# Patient Record
Sex: FEMALE | Race: WHITE | NOT HISPANIC OR LATINO | Employment: OTHER | URBAN - METROPOLITAN AREA
[De-identification: names, ages, dates, MRNs, and addresses within clinical notes are randomized per-mention and may not be internally consistent; named-entity substitution may affect disease eponyms.]

---

## 2017-02-22 ENCOUNTER — LAB VISIT (OUTPATIENT)
Dept: LAB | Facility: HOSPITAL | Age: 82
End: 2017-02-22
Attending: INTERNAL MEDICINE
Payer: MEDICARE

## 2017-02-22 DIAGNOSIS — D50.9 IRON DEFICIENCY ANEMIA, UNSPECIFIED IRON DEFICIENCY ANEMIA TYPE: ICD-10-CM

## 2017-02-22 DIAGNOSIS — E53.8 B12 DEFICIENCY: ICD-10-CM

## 2017-02-22 LAB
ALBUMIN SERPL BCP-MCNC: 4.3 G/DL
ALP SERPL-CCNC: 73 U/L
ALT SERPL W/O P-5'-P-CCNC: 33 U/L
ANION GAP SERPL CALC-SCNC: 10 MMOL/L
AST SERPL-CCNC: 33 U/L
BASOPHILS # BLD AUTO: 0.01 K/UL
BASOPHILS NFR BLD: 0.1 %
BILIRUB SERPL-MCNC: 0.8 MG/DL
BUN SERPL-MCNC: 21 MG/DL
CALCIUM SERPL-MCNC: 9.4 MG/DL
CHLORIDE SERPL-SCNC: 102 MMOL/L
CO2 SERPL-SCNC: 29 MMOL/L
CREAT SERPL-MCNC: 0.76 MG/DL
DIFFERENTIAL METHOD: ABNORMAL
EOSINOPHIL # BLD AUTO: 0.3 K/UL
EOSINOPHIL NFR BLD: 4.7 %
ERYTHROCYTE [DISTWIDTH] IN BLOOD BY AUTOMATED COUNT: 15.9 %
EST. GFR  (AFRICAN AMERICAN): >60 ML/MIN/1.73 M^2
EST. GFR  (NON AFRICAN AMERICAN): >60 ML/MIN/1.73 M^2
FERRITIN SERPL-MCNC: 15 NG/ML
GLUCOSE SERPL-MCNC: 88 MG/DL
HCT VFR BLD AUTO: 37.4 %
HGB BLD-MCNC: 12.2 G/DL
IRON SATN MFR SERPL: 10 %
IRON SERPL-MCNC: 46 UG/DL
IRON SERPL-MCNC: 46 UG/DL
LYMPHOCYTES # BLD AUTO: 2.9 K/UL
LYMPHOCYTES NFR BLD: 42 %
MCH RBC QN AUTO: 25.4 PG
MCHC RBC AUTO-ENTMCNC: 32.6 %
MCV RBC AUTO: 78 FL
MONOCYTES # BLD AUTO: 0.7 K/UL
MONOCYTES NFR BLD: 10.4 %
NEUTROPHILS # BLD AUTO: 2.9 K/UL
NEUTROPHILS NFR BLD: 42.8 %
NRBC BLD-RTO: 0 /100 WBC
PLATELET # BLD AUTO: 329 K/UL
PMV BLD AUTO: 8.7 FL
POTASSIUM SERPL-SCNC: 4.1 MMOL/L
PROT SERPL-MCNC: 7.8 G/DL
RBC # BLD AUTO: 4.8 M/UL
SODIUM SERPL-SCNC: 141 MMOL/L
TOTAL IRON BINDING CAPACITY: 442 UG/DL
VIT B12 SERPL-MCNC: 497 PG/ML
WBC # BLD AUTO: 6.81 K/UL

## 2017-02-22 PROCEDURE — 36415 COLL VENOUS BLD VENIPUNCTURE: CPT | Mod: PN

## 2017-02-22 PROCEDURE — 85025 COMPLETE CBC W/AUTO DIFF WBC: CPT

## 2017-02-22 PROCEDURE — 80053 COMPREHEN METABOLIC PANEL: CPT | Mod: PN

## 2017-02-22 PROCEDURE — 82607 VITAMIN B-12: CPT

## 2017-02-22 PROCEDURE — 82728 ASSAY OF FERRITIN: CPT

## 2017-02-22 PROCEDURE — 82607 VITAMIN B-12: CPT | Mod: PN

## 2017-02-22 PROCEDURE — 80053 COMPREHEN METABOLIC PANEL: CPT

## 2017-02-22 PROCEDURE — 83540 ASSAY OF IRON: CPT | Mod: PN

## 2017-02-22 PROCEDURE — 82728 ASSAY OF FERRITIN: CPT | Mod: PN

## 2017-02-22 PROCEDURE — 85025 COMPLETE CBC W/AUTO DIFF WBC: CPT | Mod: PN

## 2017-02-22 PROCEDURE — 83540 ASSAY OF IRON: CPT

## 2017-03-02 PROBLEM — D50.9 IRON DEFICIENCY ANEMIA: Status: ACTIVE | Noted: 2017-03-02

## 2017-04-04 PROBLEM — D51.9 VITAMIN B12 DEFICIENCY ANEMIA: Status: ACTIVE | Noted: 2017-04-04

## 2019-08-30 ENCOUNTER — TELEPHONE (OUTPATIENT)
Dept: OPTOMETRY | Facility: CLINIC | Age: 84
End: 2019-08-30

## 2019-09-04 ENCOUNTER — TELEPHONE (OUTPATIENT)
Dept: OPHTHALMOLOGY | Facility: CLINIC | Age: 84
End: 2019-09-04

## 2019-09-04 NOTE — TELEPHONE ENCOUNTER
----- Message from Tiki Hurtado sent at 9/4/2019  1:06 PM CDT -----  Contact: [phone]  Patient is returning nurse's phone call.  Please call patient back at [phone].

## 2020-03-04 ENCOUNTER — TELEPHONE (OUTPATIENT)
Dept: PAIN MEDICINE | Facility: CLINIC | Age: 85
End: 2020-03-04

## 2020-03-04 NOTE — TELEPHONE ENCOUNTER
----- Message from Gregoria Porter sent at 3/4/2020 10:09 AM CST -----  Contact: Ashly with Dr Harrison office  Type:  Sooner Apoointment Request    Caller is requesting a sooner appointment.  Caller declined first available appointment listed below.  Caller will not accept being placed on the waitlist and is requesting a message be sent to doctor.    Name of Caller:  Ashly with Dr Graves office  When is the first available appointment? 03/27  Symptoms:   Best Call Back Number: [phone] (home)   Additional Information:  Ashly with Dr Graves office stated that this the doctor's aunt, that March 27th because the pt has a lot of health issues, she also stated that the fall. pls call to advise

## 2020-03-05 ENCOUNTER — OFFICE VISIT (OUTPATIENT)
Dept: PAIN MEDICINE | Facility: CLINIC | Age: 85
End: 2020-03-05
Payer: MEDICARE

## 2020-03-05 ENCOUNTER — HOSPITAL ENCOUNTER (OUTPATIENT)
Dept: RADIOLOGY | Facility: HOSPITAL | Age: 85
Discharge: HOME | End: 2020-03-05
Attending: ANESTHESIOLOGY
Payer: MEDICARE

## 2020-03-05 VITALS
WEIGHT: 185.5 LBS | BODY MASS INDEX: 29.81 KG/M2 | HEART RATE: 86 BPM | TEMPERATURE: 97 F | DIASTOLIC BLOOD PRESSURE: 79 MMHG | SYSTOLIC BLOOD PRESSURE: 148 MMHG | HEIGHT: 66 IN | RESPIRATION RATE: 18 BRPM

## 2020-03-05 DIAGNOSIS — G89.29 CHRONIC BILATERAL LOW BACK PAIN WITHOUT SCIATICA: ICD-10-CM

## 2020-03-05 DIAGNOSIS — M47.816 LUMBAR SPONDYLOSIS: ICD-10-CM

## 2020-03-05 DIAGNOSIS — M54.50 CHRONIC BILATERAL LOW BACK PAIN WITHOUT SCIATICA: ICD-10-CM

## 2020-03-05 DIAGNOSIS — M47.816 LUMBAR SPONDYLOSIS: Primary | ICD-10-CM

## 2020-03-05 PROCEDURE — 1125F PR PAIN SEVERITY QUANTIFIED, PAIN PRESENT: ICD-10-PCS | Mod: S$GLB,,, | Performed by: ANESTHESIOLOGY

## 2020-03-05 PROCEDURE — 3288F PR FALLS RISK ASSESSMENT DOCUMENTED: ICD-10-PCS | Mod: CPTII,S$GLB,, | Performed by: ANESTHESIOLOGY

## 2020-03-05 PROCEDURE — 3078F PR MOST RECENT DIASTOLIC BLOOD PRESSURE < 80 MM HG: ICD-10-PCS | Mod: CPTII,S$GLB,, | Performed by: ANESTHESIOLOGY

## 2020-03-05 PROCEDURE — 72120 XR LUMBAR SPINE AP AND LAT WITH FLEX/EXT: ICD-10-PCS | Mod: 26,,, | Performed by: RADIOLOGY

## 2020-03-05 PROCEDURE — 3288F FALL RISK ASSESSMENT DOCD: CPT | Mod: CPTII,S$GLB,, | Performed by: ANESTHESIOLOGY

## 2020-03-05 PROCEDURE — 3077F SYST BP >= 140 MM HG: CPT | Mod: CPTII,S$GLB,, | Performed by: ANESTHESIOLOGY

## 2020-03-05 PROCEDURE — 3077F PR MOST RECENT SYSTOLIC BLOOD PRESSURE >= 140 MM HG: ICD-10-PCS | Mod: CPTII,S$GLB,, | Performed by: ANESTHESIOLOGY

## 2020-03-05 PROCEDURE — 99203 PR OFFICE/OUTPT VISIT, NEW, LEVL III, 30-44 MIN: ICD-10-PCS | Mod: S$GLB,,, | Performed by: ANESTHESIOLOGY

## 2020-03-05 PROCEDURE — 1100F PR PT FALLS ASSESS DOC 2+ FALLS/FALL W/INJURY/YR: ICD-10-PCS | Mod: CPTII,S$GLB,, | Performed by: ANESTHESIOLOGY

## 2020-03-05 PROCEDURE — 1159F MED LIST DOCD IN RCRD: CPT | Mod: S$GLB,,, | Performed by: ANESTHESIOLOGY

## 2020-03-05 PROCEDURE — 1159F PR MEDICATION LIST DOCUMENTED IN MEDICAL RECORD: ICD-10-PCS | Mod: S$GLB,,, | Performed by: ANESTHESIOLOGY

## 2020-03-05 PROCEDURE — 99999 PR PBB SHADOW E&M-EST. PATIENT-LVL III: ICD-10-PCS | Mod: PBBFAC,,, | Performed by: ANESTHESIOLOGY

## 2020-03-05 PROCEDURE — 99999 PR PBB SHADOW E&M-EST. PATIENT-LVL III: CPT | Mod: PBBFAC,,, | Performed by: ANESTHESIOLOGY

## 2020-03-05 PROCEDURE — 72100 X-RAY EXAM L-S SPINE 2/3 VWS: CPT | Mod: TC,PO

## 2020-03-05 PROCEDURE — 72100 XR LUMBAR SPINE AP AND LAT WITH FLEX/EXT: ICD-10-PCS | Mod: 26,,, | Performed by: RADIOLOGY

## 2020-03-05 PROCEDURE — 99203 OFFICE O/P NEW LOW 30 MIN: CPT | Mod: S$GLB,,, | Performed by: ANESTHESIOLOGY

## 2020-03-05 PROCEDURE — 1100F PTFALLS ASSESS-DOCD GE2>/YR: CPT | Mod: CPTII,S$GLB,, | Performed by: ANESTHESIOLOGY

## 2020-03-05 PROCEDURE — 3078F DIAST BP <80 MM HG: CPT | Mod: CPTII,S$GLB,, | Performed by: ANESTHESIOLOGY

## 2020-03-05 PROCEDURE — 1125F AMNT PAIN NOTED PAIN PRSNT: CPT | Mod: S$GLB,,, | Performed by: ANESTHESIOLOGY

## 2020-03-05 PROCEDURE — 72120 X-RAY BEND ONLY L-S SPINE: CPT | Mod: 26,,, | Performed by: RADIOLOGY

## 2020-03-05 PROCEDURE — 72100 X-RAY EXAM L-S SPINE 2/3 VWS: CPT | Mod: 26,,, | Performed by: RADIOLOGY

## 2020-03-05 RX ORDER — TRAMADOL HYDROCHLORIDE 50 MG/1
50 TABLET ORAL EVERY 6 HOURS PRN
Status: ON HOLD | COMMUNITY
End: 2021-04-29 | Stop reason: HOSPADM

## 2020-03-05 RX ORDER — ALPRAZOLAM 0.5 MG/1
1 TABLET, ORALLY DISINTEGRATING ORAL ONCE AS NEEDED
Status: CANCELLED | OUTPATIENT
Start: 2020-03-18 | End: 2031-08-14

## 2020-03-05 NOTE — LETTER
March 5, 2020      William Harrison MD  [address] [facility] [address]           [place] [facility]  [address] [place] [address]  Phone: [phone]  Fax: [phone]          Patient: Darwin Arteaga   MR Number: [id number]   YOB: 1935   Date of Visit: 3/5/2020       Dear Dr. William Harrison:    Thank you for referring Darwin Arteaga to me for evaluation. Attached you will find relevant portions of my assessment and plan of care.    If you have questions, please do not hesitate to call me. I look forward to following Darwin Arteaga along with you.    Sincerely,    Shilo Miller MD    Enclosure  CC:  No Recipients    If you would like to receive this communication electronically, please contact [email or web address] or [phone] to request more information on [employer] Link access.    For providers and/or their staff who would like to refer a patient to [facility], please contact us through our one-stop-shop provider referral line, [facility], at [phone].    If you feel you have received this communication in error or would no longer like to receive these types of communications, please e-mail [email or web address]

## 2020-03-05 NOTE — PROGRESS NOTES
[facility] New Patient Evaluation    Referred by: Dr. Harrison  Reason for referral: back pain    CC:   Chief Complaint   Patient presents with    \[facility] [place] [facility]\"" Care    Low-back Pain      Last 3 PDI Scores 3/5/2020   Pain Disability Index (PDI) 6       HPI:   Darwin Arteaga is a 84 y.o. female who complains of back pain    Onset: 3 weeks  Inciting Event: slipped on gravel, slow and controlled fall backwards  Progression: since onset, pain is stable  Typical Range: 3-8/10  Timing: constant  Quality: dull, aching, deep  Radiation: no  Associated numbness or weakness: no numbness, no weakness  Exacerbated by: sitting, bending, lifting, extension  Allievated by: rest, sitting  Is Pain Level Acceptable?: No    Previous Therapies:  PT/OT:   HEP:   Interventions:   Surgery:  Medications:   - NSAIDS:   - MSK Relaxants:   - TCAs:   - SNRIs:   - Topicals:   - Anticonvulsants:  - Opioids: tramadol    History:    Current Outpatient Medications:     traMADol (ULTRAM) 50 mg tablet, Take 50 mg by mouth every 6 (six) hours as needed for Pain., Disp: , Rfl:     AMINO ACIDS/MV,FE,MIN (OCUVITE EXTRA ORAL), Take by mouth., Disp: , Rfl:     aspirin (ECOTRIN) 81 MG EC tablet, Take 81 mg by mouth once daily., Disp: , Rfl:     calcium carbonate (OS-AYLEEN) 600 mg (1,500 mg) Tab, Take 600 mg by mouth 2 (two) times daily with meals., Disp: , Rfl:     cetirizine (ZYRTEC) 10 MG tablet, Take 10 mg by mouth once daily., Disp: , Rfl:     docusate sodium (COLACE) 100 MG capsule, Take 100 mg by mouth 2 (two) times daily., Disp: , Rfl:     folic acid (FOLVITE) 1 MG tablet, Take 1 mg by mouth once daily., Disp: , Rfl:     magnesium oxide 500 mg Tab, Take by mouth., Disp: , Rfl:     multivitamin capsule, Take 1 capsule by mouth once daily., Disp: , Rfl:     triamterene-hydrochlorothiazide 37.5-25 mg (DYAZIDE) 37.5-25 mg per capsule, Take 1 capsule by mouth every morning., Disp: , Rfl:     Past Medical History:   Diagnosis Date    Anemia      Arthritis     Osteoporosis        Past Surgical History:   Procedure Laterality Date    HYSTERECTOMY      KNEE SURGERY      TONSILLECTOMY         Family History   Problem Relation Age of Onset    No Known Problems Mother     Heart attack Father        Social History     Socioeconomic History    Marital status:      Spouse name: Not on file    Number of children: Not on file    Years of education: Not on file    Highest education level: Not on file   Occupational History    Not on file   Social Needs    Financial resource strain: Not on file    Food insecurity:     Worry: Not on file     Inability: Not on file    Transportation needs:     Medical: Not on file     Non-medical: Not on file   Tobacco Use    Smoking status: Never Smoker   Substance and Sexual Activity    Alcohol use: No    Drug use: Not on file    Sexual activity: Not on file   Lifestyle    Physical activity:     Days per week: Not on file     Minutes per session: Not on file    Stress: Not on file   Relationships    Social connections:     Talks on phone: Not on file     Gets together: Not on file     Attends Zoroastrian service: Not on file     Active member of club or organization: Not on file     Attends meetings of clubs or organizations: Not on file     Relationship status: Not on file   Other Topics Concern    Not on file   Social History Narrative    Not on file       Review of patient's allergies indicates:  No Known Allergies    Review of Systems:  General ROS: negative for - fever  Psychological ROS: negative for - hostility  Hematological and Lymphatic ROS: negative for - bleeding problems  Endocrine ROS: negative for - unexpected weight changes  Respiratory ROS: no cough, shortness of breath, or wheezing  Cardiovascular ROS: no chest pain or dyspnea on exertion  Gastrointestinal ROS: no abdominal pain, change in bowel habits, or black or bloody stools  Musculoskeletal ROS: negative for - muscular  "weakness  Neurological ROS: negative for - numbness/tingling  Dermatological ROS: negative for rash    Physical Exam:  Vitals:    03/05/20 1311   BP: (!) 148/79   Pulse: 86   Resp: 18   Temp: 97.4 °F (36.3 °C)   TempSrc: Oral   Weight: 84.1 kg (185 lb 8.3 oz)   Height: 5' 6" (1.676 m)   PainSc:   4   PainLoc: Back     Body mass index is 29.94 kg/m².     Gen: NAD  Gait: gait intact  Psych:  Mood appropriate for given condition  HEENT: eyes anicteric   GI: Abd soft  CV: RRR  Lungs: breathing unlabored   ROM: limited AROM of the L spine in all planes, full ROM at ankles, knees and hips  Lumbar flexion 90 degrees, extension 50 degrees, side bending 30 degrees.    Sensation: intact to light touch in all dermatomes tested from L2-S1 bilaterally  Reflexes: 2+ b/l patella and 0/0 b/l achilles  Palpation: -TTP over midline lumbar spine,  the b/l greater trochanters or bilateral SI joint  Tone: normal in the b/l knees and hips   Skin: intact  Extremities: No edema in b/l ankles or hands  Provacative tests: + b/l axial facet loading       Right Left   L2/3 Iliacus Hip flexion  5  5   L3/4 Qudratus Femoris Knee Extension  5  5   L4/5 Tib Anterior Ankle Dorsiflexion   5  5   L5/S1 Extensor Hallicus Longus Great toe extension  5  5   L4/5 Tib Anterior/Posterior Inversion  5  5   L5/S1 Extensor Digitorum Longus, Peronues Eversion  5  5   S1/S2 Gastroc/Soleus Plantar Flexion  5  5       Imaging:  MRI lumbar spine 3/2018 independently reviewed  She has bilateral facet arthropathy throughout the lumbar spine worse at L3/4, L4/5, and L5/S1    Labs:  BMP  Lab Results   Component Value Date     02/22/2017    K 4.1 02/22/2017     02/22/2017    CO2 29 02/22/2017    BUN 21 (H) 02/22/2017    CREATININE 0.76 02/22/2017    CALCIUM 9.4 02/22/2017    ANIONGAP 10 02/22/2017    ESTGFRAFRICA >60 02/22/2017    EGFRNONAA >60 02/22/2017     Lab Results   Component Value Date    ALT 33 02/22/2017    AST 33 02/22/2017    ALKPHOS 73 " 02/22/2017    BILITOT 0.8 02/22/2017       Assessment:   Problem List Items Addressed This Visit        Neuro    Lumbar spondylosis - Primary    Relevant Orders    X-Ray Lumbar Spine Ap Lateral w/Flex Ext    Ambulatory referral/consult to Physical/Occupational Therapy       Orthopedic    Chronic bilateral low back pain without sciatica          84 y.o. year old female presents to the office with back pain.  She's had back pain for many years and it has been previously controlled by pain management in [place].  She reports a history of previous EVAN which worked well for her.  Today she reports about 3 weeks ago she slowly and in a controlled fashion fell backwards when she slipped on some gravel.  Today she reports axial lower back pain, constant, dull, aching, 3-8/10.  Her pain is worse with going from sitting to standing and back extension and relieved with rest.  She denies any numbness or weakness.  On exam she has + b/l axial facet loading.  She does not have any tenderness to midline palpation to suspect pain from compression fracture.  MRI lumbar spine from 3/2018 reviewed today and she has bilateral facet arthropathy throughout the lumbar spine worse at L3/4, L4/5, and L5/S1.  Her pain is limiting her mobility and interfering with her ADL's.  We will schedule for diagnostic L3/4, L4/5, and L5/S1 medial branch blocks.  I've also placed a referral for her to start formal PT.  Follow up 1 week post injection.  If she doesn't get significant relief we can get an update lumbar MRI to assess her neuroanatomy.      Treatment Plan:   Procedures: diagnostic b/l L3/4, L4/5, and L5/S1 medial branch blocks. Procedure explained using an anatomical model.  Risks, benefits, alternatives explained to patient who verbalized understanding, including increased risk of infection, bleeding, need for additional procedures or surgery, and nerve damage.  Questions regarding the procedure, risks, expected outcome, and possible side  effects were solicited and answered to the patient's satisfaction.  Darwin wishes to proceed with the injection.  Verbal and written consent were obtained in clinic today.  PT/OT/HEP: Referral given for physical therapy to improve function and strength, and to receive training towards establishing a safe and effective home exercise program (HEP).   Medications: continue medications as prescribed  Labs: Reviewed and medications are appropriately dosed for current hepatorenal function.  Imaging: xray lumbar spine flex/ex    : Reviewed and consistent with medication use as prescribed.    Shilo Miller M.D.  Interventional Pain Medicine / Anesthesiology

## 2020-03-06 ENCOUNTER — TELEPHONE (OUTPATIENT)
Dept: PAIN MEDICINE | Facility: CLINIC | Age: 85
End: 2020-03-06

## 2020-03-06 NOTE — TELEPHONE ENCOUNTER
----- Message from Jeni Pennington sent at 3/6/2020  8:32 AM CST -----  Contact: Patient  Type: Needs Medical Advice  Who Called:  patient  Best Call Back Number: [phone]  Additional Information: Patient states on 3/18/20 she is having a procedure but she needs time to be early in the morning not waiting until the afternoon due to fasting.  Please call to advise.  Thanks!

## 2020-03-06 NOTE — TELEPHONE ENCOUNTER
Spoke with patient and advised that she would have to switch days to get a morning, she states that she will keep that date. And I reminded her that it is only a 6 hour fast.

## 2020-03-06 NOTE — TELEPHONE ENCOUNTER
----- Message from Jeni Pennington sent at 3/6/2020  8:32 AM CST -----  Contact: Patient  Type: Needs Medical Advice  Who Called:  patient  Best Call Back Number: [phone]  Additional Information: Patient states on 3/18/20 she is having a procedure but she needs time to be early in the morning not waiting until the afternoon due to fasting.  Please call to advise.  Thanks!

## 2020-03-17 ENCOUNTER — TELEPHONE (OUTPATIENT)
Dept: PAIN MEDICINE | Facility: CLINIC | Age: 85
End: 2020-03-17

## 2020-03-17 RX ORDER — GABAPENTIN 100 MG/1
100 CAPSULE ORAL 2 TIMES DAILY
Qty: 60 CAPSULE | Refills: 0 | Status: SHIPPED | OUTPATIENT
Start: 2020-03-17 | End: 2020-04-09 | Stop reason: SDUPTHER

## 2020-03-17 NOTE — TELEPHONE ENCOUNTER
Spoke with patient and advised her procedure will be postponed until further notice. She states that she has been in bed with back pain for weeks and asked if there is anything else she can do in the mean time until she can be rescheduled.

## 2020-03-17 NOTE — TELEPHONE ENCOUNTER
We can trial gabapentin 100mg.    Start 100mg qhs x1 week  Then if tolerated well can increase to 100mg BID    I've sent to her pharmacy.    Also, please mail her list of home exercises for her lower back to start at home in case PT as closed.

## 2020-03-18 NOTE — TELEPHONE ENCOUNTER
Patient notified and she will  medication and let our office know if she has any side effects. Will mail the exercises to the patient.

## 2020-04-09 RX ORDER — GABAPENTIN 100 MG/1
100 CAPSULE ORAL 2 TIMES DAILY
Qty: 60 CAPSULE | Refills: 0 | Status: SHIPPED | OUTPATIENT
Start: 2020-04-09 | End: 2020-05-05 | Stop reason: SDUPTHER

## 2020-05-05 RX ORDER — GABAPENTIN 100 MG/1
100 CAPSULE ORAL 2 TIMES DAILY
Qty: 60 CAPSULE | Refills: 2 | Status: SHIPPED | OUTPATIENT
Start: 2020-05-05 | End: 2020-11-16 | Stop reason: SDUPTHER

## 2020-05-07 ENCOUNTER — TELEPHONE (OUTPATIENT)
Dept: PAIN MEDICINE | Facility: CLINIC | Age: 85
End: 2020-05-07

## 2020-05-07 NOTE — TELEPHONE ENCOUNTER
Spoke with patient again and she states that I misunderstood her and that she never has pain. She states that her whole body hurts from arthritis and when she was in [place] she got steroid injections in her back that helped her feel better. I explained to patient that she can come into clinic to discuss this with Dr Miller and I made her a follow up appointment.

## 2020-05-07 NOTE — TELEPHONE ENCOUNTER
Spoke with patient about rescheduling her procedure and she states that she is not having back pain right now. She states she is taking the gabapentin once at night and that is helping. She also states that it was discussed at a previous visit that she might need a new MRI, she would like to get that ordered prior to scheduling any procedures in the future.

## 2020-05-07 NOTE — TELEPHONE ENCOUNTER
If her pain has improved that is great and we can hold off on both injections and imaging.      If something changes then yes, we can get an MRI prior to any procedures.

## 2020-05-19 ENCOUNTER — OFFICE VISIT (OUTPATIENT)
Dept: PAIN MEDICINE | Facility: CLINIC | Age: 85
End: 2020-05-19
Payer: MEDICARE

## 2020-05-19 VITALS
BODY MASS INDEX: 29.69 KG/M2 | RESPIRATION RATE: 18 BRPM | SYSTOLIC BLOOD PRESSURE: 122 MMHG | HEART RATE: 70 BPM | DIASTOLIC BLOOD PRESSURE: 69 MMHG | WEIGHT: 184 LBS | TEMPERATURE: 97 F | OXYGEN SATURATION: 100 %

## 2020-05-19 DIAGNOSIS — M47.816 LUMBAR SPONDYLOSIS: Primary | ICD-10-CM

## 2020-05-19 PROCEDURE — 99999 PR PBB SHADOW E&M-EST. PATIENT-LVL III: ICD-10-PCS | Mod: PBBFAC,,, | Performed by: NURSE PRACTITIONER

## 2020-05-19 PROCEDURE — 99212 OFFICE O/P EST SF 10 MIN: CPT | Mod: S$GLB,,, | Performed by: NURSE PRACTITIONER

## 2020-05-19 PROCEDURE — 99212 PR OFFICE/OUTPT VISIT, EST, LEVL II, 10-19 MIN: ICD-10-PCS | Mod: S$GLB,,, | Performed by: NURSE PRACTITIONER

## 2020-05-19 PROCEDURE — 99999 PR PBB SHADOW E&M-EST. PATIENT-LVL III: CPT | Mod: PBBFAC,,, | Performed by: NURSE PRACTITIONER

## 2020-05-19 NOTE — PROGRESS NOTES
[facility] Follow-up Evaluation    Referred by: Dr. Harrison  Reason for referral: back pain    CC:   Chief Complaint   Patient presents with    Follow-up    Weakness     lower back      Last 3 PDI Scores 5/19/2020 3/5/2020   Pain Disability Index (PDI) 2 6     Interval HPI 5/19/20:  Mrs Arteaga returns to clinic today.  She was last seen 3/5/20 complaining of a controlled fall getting out of her SUV in a parking lot, her feet slipped on pea gravel and she held onto handle bars in her car and sat herself on the ground.  She reports she had intense stiffness getting out of bed and standing from a sitting position but today she reports she has worked through the stiffness and no longer has trouble standing from a sitting position.  She did not go to formal physical therapy due to COVID but she would like to return when she feels it is safe.  She states today she has no pain, an occasional low back weakness, discomfort with vacuuming but she sits down for about 30 minutes and the pain is relieved.  She reports she is able to stand and cook in the kitchen for 4 hours at a time with no pain or discomfort.  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      HPI:   Darwin Arteaga is a 84 y.o. female who complains of back pain    Onset: 3 weeks  Inciting Event: slipped on gravel, slow and controlled fall backwards  Progression: since onset, pain is stable  Typical Range: 3-8/10  Timing: constant  Quality: dull, aching, deep  Radiation: no  Associated numbness or weakness: no numbness, no weakness  Exacerbated by: sitting, bending, lifting, extension  Allievated by: rest, sitting  Is Pain Level Acceptable?: No    Previous Therapies:  PT/OT:   HEP:   Interventions:   Surgery:  Medications:   - NSAIDS:   - MSK Relaxants:   - TCAs:   - SNRIs:   - Topicals:   - Anticonvulsants:  - Opioids: tramadol    History:    Current Outpatient Medications:     AMINO ACIDS/MV,FE,MIN  (OCUVITE EXTRA ORAL), Take by mouth., Disp: , Rfl:     aspirin (ECOTRIN) 81 MG EC tablet, Take 81 mg by mouth once daily., Disp: , Rfl:     calcium carbonate (OS-AYLEEN) 600 mg (1,500 mg) Tab, Take 600 mg by mouth 2 (two) times daily with meals., Disp: , Rfl:     cetirizine (ZYRTEC) 10 MG tablet, Take 10 mg by mouth once daily., Disp: , Rfl:     docusate sodium (COLACE) 100 MG capsule, Take 100 mg by mouth 2 (two) times daily., Disp: , Rfl:     folic acid (FOLVITE) 1 MG tablet, Take 1 mg by mouth once daily., Disp: , Rfl:     gabapentin (NEURONTIN) 100 MG capsule, Take 1 capsule (100 mg total) by mouth 2 (two) times daily., Disp: 60 capsule, Rfl: 2    magnesium oxide 500 mg Tab, Take by mouth., Disp: , Rfl:     multivitamin capsule, Take 1 capsule by mouth once daily., Disp: , Rfl:     traMADol (ULTRAM) 50 mg tablet, Take 50 mg by mouth every 6 (six) hours as needed for Pain., Disp: , Rfl:     triamterene-hydrochlorothiazide 37.5-25 mg (DYAZIDE) 37.5-25 mg per capsule, Take 1 capsule by mouth every morning., Disp: , Rfl:     Past Medical History:   Diagnosis Date    Anemia     Arthritis     Osteoporosis        Past Surgical History:   Procedure Laterality Date    HYSTERECTOMY      KNEE SURGERY      TONSILLECTOMY         Family History   Problem Relation Age of Onset    No Known Problems Mother     Heart attack Father        Social History     Socioeconomic History    Marital status:      Spouse name: Not on file    Number of children: Not on file    Years of education: Not on file    Highest education level: Not on file   Occupational History    Not on file   Social Needs    Financial resource strain: Not on file    Food insecurity:     Worry: Not on file     Inability: Not on file    Transportation needs:     Medical: Not on file     Non-medical: Not on file   Tobacco Use    Smoking status: Never Smoker   Substance and Sexual Activity    Alcohol use: No    Drug use: Not on file     Sexual activity: Not on file   Lifestyle    Physical activity:     Days per week: Not on file     Minutes per session: Not on file    Stress: Not on file   Relationships    Social connections:     Talks on phone: Not on file     Gets together: Not on file     Attends Confucianism service: Not on file     Active member of club or organization: Not on file     Attends meetings of clubs or organizations: Not on file     Relationship status: Not on file   Other Topics Concern    Not on file   Social History Narrative    Not on file       Review of patient's allergies indicates:  No Known Allergies    Review of Systems:  General ROS: negative for - fever  Psychological ROS: negative for - hostility  Hematological and Lymphatic ROS: negative for - bleeding problems  Endocrine ROS: negative for - unexpected weight changes  Respiratory ROS: no cough, shortness of breath, or wheezing  Cardiovascular ROS: no chest pain or dyspnea on exertion  Gastrointestinal ROS: no abdominal pain, change in bowel habits, or black or bloody stools  Musculoskeletal ROS: negative for - muscular weakness  Neurological ROS: negative for - numbness/tingling  Dermatological ROS: negative for rash    Physical Exam:  Vitals:    05/19/20 1343   BP: 122/69   Pulse: 70   Resp: 18   Temp: 97 °F (36.1 °C)   TempSrc: Oral   SpO2: 100%   Weight: 83.4 kg (183 lb 15.6 oz)   PainSc:   2   PainLoc: Back     Body mass index is 29.69 kg/m².     Gen: NAD  Gait: gait intact  Psych:  Mood appropriate for given condition  HEENT: eyes anicteric   GI: Abd soft  CV: RRR  Lungs: breathing unlabored   ROM: limited AROM of the L spine in all planes, full ROM at ankles, knees and hips  Lumbar flexion 90 degrees able to touch floor with finger tips, extension 50 degrees, side bending 30 degrees.    Sensation: intact to light touch in all dermatomes tested from L2-S1 bilaterally  Reflexes: 2+ b/l patella and 1/1 b/l achilles  Palpation: -TTP over midline lumbar spine,  the  b/l greater trochanters or bilateral SI joint  Tone: normal in the b/l knees and hips   Skin: intact  Extremities: No edema in b/l ankles or hands  Provacative tests: - b/l axial facet loading, - b/l SLR, - Patricks       Right Left   L2/3 Iliacus Hip flexion  5  5-   L3/4 Qudratus Femoris Knee Extension  5  5   L4/5 Tib Anterior Ankle Dorsiflexion   5  5   L5/S1 Extensor Hallicus Longus Great toe extension  5  5   L4/5 Tib Anterior/Posterior Inversion  5  5   L5/S1 Extensor Digitorum Longus, Peronues Eversion  5  5   S1/S2 Gastroc/Soleus Plantar Flexion  5  5       Imaging:  MRI lumbar spine 3/2018 independently reviewed  She has bilateral facet arthropathy throughout the lumbar spine worse at L3/4, L4/5, and L5/S1    Labs:  BMP  Lab Results   Component Value Date     02/22/2017    K 4.1 02/22/2017     02/22/2017    CO2 29 02/22/2017    BUN 21 (H) 02/22/2017    CREATININE 0.76 02/22/2017    CALCIUM 9.4 02/22/2017    ANIONGAP 10 02/22/2017    ESTGFRAFRICA >60 02/22/2017    EGFRNONAA >60 02/22/2017     Lab Results   Component Value Date    ALT 33 02/22/2017    AST 33 02/22/2017    ALKPHOS 73 02/22/2017    BILITOT 0.8 02/22/2017       Assessment:   Problem List Items Addressed This Visit     None          84 y.o. year old female presents to the office with back pain.  She's had back pain for many years and it has been previously controlled by pain management in [place].  She reports a history of previous EVAN which worked well for her.  Today she reports about 3 weeks ago she slowly and in a controlled fashion fell backwards when she slipped on some gravel.  Today she reports axial lower back pain, constant, dull, aching, 3-8/10.  Her pain is worse with going from sitting to standing and back extension and relieved with rest.  She denies any numbness or weakness.  On exam she has + b/l axial facet loading.  She does not have any tenderness to midline palpation to suspect pain from compression fracture.  MRI  lumbar spine from 3/2018 reviewed today and she has bilateral facet arthropathy throughout the lumbar spine worse at L3/4, L4/5, and L5/S1.  Her pain is limiting her mobility and interfering with her ADL's.  We will schedule for diagnostic L3/4, L4/5, and L5/S1 medial branch blocks.  I've also placed a referral for her to start formal PT.  Follow up 1 week post injection.  If she doesn't get significant relief we can get an update lumbar MRI to assess her neuroanatomy.      5/19/20 - Mrs Arteaga returns to clinic today.  She was last seen 3/5/20 complaining of a controlled fall getting out of her SUV in a parking lot, her feet slipped on pea gravel and she held onto handle bars in her car and sat herself on the ground.  She reports she had intense stiffness getting out of bed and standing from a sitting position but today she reports she has worked through the stiffness and no longer has trouble standing from a sitting position.  She did not go to formal physical therapy due to COVID but she would like to return when she feels it is safe.  She states today she has no pain, an occasional low back weakness, discomfort with vacuuming but she sits down for about 30 minutes and the pain is relieved.  She reports she is able to stand and cook in the kitchen for 4 hours at a time with no pain or discomfort.  [place] which may explain the degeneration seen on her lumbar spine xray and MRI. She would like to defer her MBB procedure at this time as she feel her pain has resolved.  She will follow up as needed.      Treatment Plan:   Procedures: none  PT/OT/HEP: she will call for PT order after COVID clears, continue HEP  Medications: continue medications as prescribed per PCP  Labs: Reviewed and medications are appropriately dosed for current hepatorenal function.  Imaging: none  : Reviewed and consistent with medication use as prescribed.

## 2020-11-03 ENCOUNTER — OFFICE VISIT (OUTPATIENT)
Dept: PAIN MEDICINE | Facility: CLINIC | Age: 85
End: 2020-11-03
Payer: MEDICARE

## 2020-11-03 VITALS
SYSTOLIC BLOOD PRESSURE: 138 MMHG | DIASTOLIC BLOOD PRESSURE: 69 MMHG | HEIGHT: 68 IN | BODY MASS INDEX: 27.32 KG/M2 | WEIGHT: 180.25 LBS | HEART RATE: 85 BPM | OXYGEN SATURATION: 96 % | TEMPERATURE: 98 F

## 2020-11-03 DIAGNOSIS — M47.816 LUMBAR SPONDYLOSIS: Primary | ICD-10-CM

## 2020-11-03 DIAGNOSIS — Z11.9 SCREENING EXAMINATION FOR INFECTIOUS DISEASE: ICD-10-CM

## 2020-11-03 PROCEDURE — 99999 PR PBB SHADOW E&M-EST. PATIENT-LVL IV: ICD-10-PCS | Mod: PBBFAC,,, | Performed by: NURSE PRACTITIONER

## 2020-11-03 PROCEDURE — 99214 PR OFFICE/OUTPT VISIT, EST, LEVL IV, 30-39 MIN: ICD-10-PCS | Mod: S$GLB,,, | Performed by: NURSE PRACTITIONER

## 2020-11-03 PROCEDURE — 99999 PR PBB SHADOW E&M-EST. PATIENT-LVL IV: CPT | Mod: PBBFAC,,, | Performed by: NURSE PRACTITIONER

## 2020-11-03 PROCEDURE — 99214 OFFICE O/P EST MOD 30 MIN: CPT | Mod: S$GLB,,, | Performed by: NURSE PRACTITIONER

## 2020-11-03 RX ORDER — SODIUM CHLORIDE, SODIUM LACTATE, POTASSIUM CHLORIDE, CALCIUM CHLORIDE 600; 310; 30; 20 MG/100ML; MG/100ML; MG/100ML; MG/100ML
INJECTION, SOLUTION INTRAVENOUS CONTINUOUS
Status: CANCELLED | OUTPATIENT
Start: 2020-11-10

## 2020-11-03 NOTE — PROGRESS NOTES
[facility] Follow-up Evaluation    Referred by: Dr. Harrison  Reason for referral: back pain    CC:   Chief Complaint   Patient presents with    Hip Pain     both    Rectal Pain      Last 3 PDI Scores 11/3/2020 5/19/2020 3/5/2020   Pain Disability Index (PDI) 24 2 6       Interval HPI 11/3/20:  Mrs Arteaga returns to clinic today for follow up, her sister is in attendance.  She complains of axial lower back and hip pain, constant, dull, aching, 3-8/10 can increase to 10/10.  Her pain is worse when first getting out of bed in the morning, with going from sitting to standing and back extension and relieved with rest.  She denies any radiation into her lower extremities, no numbness or weakness.  She has been to formal PT and continues HEP.  She takes gabapentin at night, makes her too drowsy during the day.  She was prescribed a steroid dose pack per her PCP and will finish it today, reports mild pain relief.         HPI:   Darwin Arteaga is a 85 y.o. female who complains of back pain    Onset: 3 weeks  Inciting Event: slipped on gravel, slow and controlled fall backwards  Progression: since onset, pain is stable  Typical Range: 3-8/10  Timing: constant  Quality: dull, aching, deep  Radiation: no  Associated numbness or weakness: no numbness, no weakness  Exacerbated by: sitting, bending, lifting, extension  Allievated by: rest, sitting  Is Pain Level Acceptable?: No    Previous Therapies:  PT/OT:   HEP:   Interventions:   Surgery:  Medications:   - NSAIDS:   - MSK Relaxants:   - TCAs:   - SNRIs:   - Topicals:   - Anticonvulsants:  - Opioids: tramadol    History:    Current Outpatient Medications:     AMINO ACIDS/MV,FE,MIN (OCUVITE EXTRA ORAL), Take by mouth., Disp: , Rfl:     aspirin (ECOTRIN) 81 MG EC tablet, Take 81 mg by mouth once daily., Disp: , Rfl:     calcium carbonate (OS-AYLEEN) 600 mg (1,500 mg) Tab, Take 600 mg by mouth 2 (two) times daily with meals., Disp: , Rfl:     cetirizine (ZYRTEC) 10 MG  tablet, Take 10 mg by mouth once daily., Disp: , Rfl:     docusate sodium (COLACE) 100 MG capsule, Take 100 mg by mouth 2 (two) times daily., Disp: , Rfl:     folic acid (FOLVITE) 1 MG tablet, Take 1 mg by mouth once daily., Disp: , Rfl:     magnesium oxide 500 mg Tab, Take by mouth., Disp: , Rfl:     multivitamin capsule, Take 1 capsule by mouth once daily., Disp: , Rfl:     traMADol (ULTRAM) 50 mg tablet, Take 50 mg by mouth every 6 (six) hours as needed for Pain., Disp: , Rfl:     triamterene-hydrochlorothiazide 37.5-25 mg (DYAZIDE) 37.5-25 mg per capsule, Take 1 capsule by mouth every morning., Disp: , Rfl:     gabapentin (NEURONTIN) 100 MG capsule, Take 1 capsule (100 mg total) by mouth 2 (two) times daily., Disp: 60 capsule, Rfl: 2    Past Medical History:   Diagnosis Date    Anemia     Arthritis     Osteoporosis        Past Surgical History:   Procedure Laterality Date    HYSTERECTOMY      KNEE SURGERY      TONSILLECTOMY         Family History   Problem Relation Age of Onset    No Known Problems Mother     Heart attack Father        Social History     Socioeconomic History    Marital status:      Spouse name: Not on file    Number of children: Not on file    Years of education: Not on file    Highest education level: Not on file   Occupational History    Not on file   Social Needs    Financial resource strain: Not on file    Food insecurity     Worry: Not on file     Inability: Not on file    Transportation needs     Medical: Not on file     Non-medical: Not on file   Tobacco Use    Smoking status: Never Smoker   Substance and Sexual Activity    Alcohol use: No    Drug use: Not on file    Sexual activity: Not on file   Lifestyle    Physical activity     Days per week: Not on file     Minutes per session: Not on file    Stress: Not on file   Relationships    Social connections     Talks on phone: Not on file     Gets together: Not on file     Attends Baptism service: Not  "on file     Active member of club or organization: Not on file     Attends meetings of clubs or organizations: Not on file     Relationship status: Not on file   Other Topics Concern    Not on file   Social History Narrative    Not on file       Review of patient's allergies indicates:  No Known Allergies    Review of Systems:  General ROS: negative for - fever  Psychological ROS: negative for - hostility  Hematological and Lymphatic ROS: negative for - bleeding problems  Endocrine ROS: negative for - unexpected weight changes  Respiratory ROS: no cough, shortness of breath, or wheezing  Cardiovascular ROS: no chest pain or dyspnea on exertion  Gastrointestinal ROS: no abdominal pain, change in bowel habits, or black or bloody stools  Musculoskeletal ROS: negative for - muscular weakness  Neurological ROS: negative for - numbness/tingling  Dermatological ROS: negative for rash    Physical Exam:  Vitals:    11/03/20 1402   BP: 138/69   Pulse: 85   Temp: 98.2 °F (36.8 °C)   TempSrc: Temporal   SpO2: 96%   Weight: 81.8 kg (180 lb 3.6 oz)   Height: 5' 8" (1.727 m)   PainSc: 10-Worst pain ever   PainLoc: Hip     Body mass index is 27.4 kg/m².     Gen: NAD  Gait: gait intact  Psych:  Mood appropriate for given condition  HEENT: eyes anicteric   GI: Abd soft  CV: RRR  Lungs: breathing unlabored   ROM: limited AROM of the L spine in all planes, full ROM at ankles, knees and hips  Lumbar flexion 90 degrees able to touch floor with finger tips, extension 50 degrees, side bending 30 degrees.    Sensation: intact to light touch in all dermatomes tested from L2-S1 bilaterally  Reflexes: 2+ b/l patella and 1/1 b/l achilles  Palpation: -TTP over midline lumbar spine,  the b/l greater trochanters or bilateral SI joint  Tone: normal in the b/l knees and hips   Skin: intact  Extremities: No edema in b/l ankles or hands  Provacative tests: + b/l axial facet loading, - b/l SLR       Right Left   L2/3 Iliacus Hip flexion  5  5   L3/4 " Qudratus Femoris Knee Extension  5  5   L4/5 Tib Anterior Ankle Dorsiflexion   5  5   L5/S1 Extensor Hallicus Longus Great toe extension  5  5   L4/5 Tib Anterior/Posterior Inversion  5  5   L5/S1 Extensor Digitorum Longus, Peronues Eversion  5  5   S1/S2 Gastroc/Soleus Plantar Flexion  5  5       Imaging:  MRI lumbar spine 3/2018 independently reviewed  She has bilateral facet arthropathy throughout the lumbar spine worse at L3/4, L4/5, and L5/S1    XR LUMBAR SPINE AP AND LAT WITH FLEX/EXT 3/5/20  CLINICAL HISTORY:  Spondylosis without myelopathy or radiculopathy, lumbar region  TECHNIQUE:  AP and lateral views as well as lateral flexion and extension images are performed through the lumbar spine.  COMPARISON:  None  FINDINGS:  The bones are osteopenic.  There is a moderate to marked anterior wedge compression deformity of L1 which is age indeterminate.  There is trace retropulsion into the ventral spinal canal.  There is also trace retrolisthesis of L2 on L3, L3 on L4.  There is facet joint arthropathy at the lower 2 lumbar levels and mild disc space narrowing through the lumbar spine.  There is bridging osteophyte formation at the thoracolumbar junction.  There is moderate atherosclerosis.  There is no detectable change in alignment on flexion or extension.  Impression:  1. Osteopenia with degenerative disc and facet disease as well as a moderate to marked age-indeterminate anterior wedge compression deformity of L1.  This report was flagged in Epic as abnormal.    MRI lumbar spine 2/24/20 - legacy documents    Labs:  BMP  Lab Results   Component Value Date     02/22/2017    K 4.1 02/22/2017     02/22/2017    CO2 29 02/22/2017    BUN 21 (H) 02/22/2017    CREATININE 0.76 02/22/2017    CALCIUM 9.4 02/22/2017    ANIONGAP 10 02/22/2017    ESTGFRAFRICA >60 02/22/2017    EGFRNONAA >60 02/22/2017     Lab Results   Component Value Date    ALT 33 02/22/2017    AST 33 02/22/2017    ALKPHOS 73 02/22/2017     BILITOT 0.8 02/22/2017     Lab Results   Component Value Date    WBC 6.81 02/22/2017    HGB 12.2 02/22/2017    HCT 37.4 02/22/2017    MCV 78 (L) 02/22/2017     02/22/2017       Assessment:   Problem List Items Addressed This Visit        Neuro    Lumbar spondylosis - Primary    Relevant Orders    Case Request Operating Room: Block-nerve-medial branch-lumbar L3/4, L4/5, L5/s1 (Completed)      Other Visit Diagnoses     Screening examination for infectious disease        Relevant Orders    COVID-19 Routine Screening          85 y.o. year old female presents to the office with back pain.  She's had back pain for many years and it has been previously controlled by pain management in [place].  She reports a history of previous EVAN which worked well for her.        11/3/20 -  Mrs Arteaga returns to clinic today for follow up, her sister is in attendance.  She complains of axial lower back and hipk, constant, dull, aching, 3-8/10 can increase to 10/10.  Her pain is worse when first getting out of bed in the morning, with going from sitting to standing and back extension and relieved with rest.  She denies any radiation into her lower extremities, no numbness or weakness.  She has been to formal PT and continues HEP.  She takes gabapentin at night, makes her too drowsy during the day.  She was prescribed a steroid dose pack per her PCP and will finish it today, reports mild pain relief.   On exam she has 5/5 strength b/l LE, sensation intact to light touch L2-S1 b/l, 2+ b/l patella and 1+ b/l achilles DTRs, + b/l axial facet loading.  We reviewed her lumbar spine MRI report from 3/2018 together today, c/w bilateral facet arthropathy throughout the lumbar spine worse at L3/4, L4/5, and L5/S1.  Her pain is limiting her mobility and interfering with her ADL's.  We will schedule for bilateral diagnostic L3/4, L4/5, and L5/S1 medial branch blocks followed by subsequent RFA if appropriate.  Follow up 1 month post injection.  If  she doesn't obtain relief, we will order an update lumbar MRI to assess her neuroanatomy for consideration of EVAN.        Treatment Plan:   Procedures: bilateral diagnostic L3/4, L4/5, and L5/S1 medial branch blocks.  Procedure explained using an anatomical model.  Risks, benefits, alternatives explained to patient who verbalized understanding, including increased risk of infection, bleeding, need for additional procedures or surgery, and nerve damage.  Questions regarding the procedure, risks, expected outcome, and possible side effects were solicited and answered to the patient's satisfaction.  Darwin wishes to proceed with the injection.  Verbal and written consent were obtained in clinic today.  PT/OT/HEP:  continue HEP, prefers to avoid inpatient PT during COVID  Medications: continue medications as prescribed per PCP  Labs: Reviewed and medications are appropriately dosed for current hepatorenal function.  Imaging: none  : Reviewed and consistent with medication use as prescribed.

## 2020-11-03 NOTE — H&P (VIEW-ONLY)
[facility] Follow-up Evaluation    Referred by: Dr. Harrison  Reason for referral: back pain    CC:   Chief Complaint   Patient presents with    Hip Pain     both    Rectal Pain      Last 3 PDI Scores 11/3/2020 5/19/2020 3/5/2020   Pain Disability Index (PDI) 24 2 6       Interval HPI 11/3/20:  Mrs Arteaga returns to clinic today for follow up, her sister is in attendance.  She complains of axial lower back and hip pain, constant, dull, aching, 3-8/10 can increase to 10/10.  Her pain is worse when first getting out of bed in the morning, with going from sitting to standing and back extension and relieved with rest.  She denies any radiation into her lower extremities, no numbness or weakness.  She has been to formal PT and continues HEP.  She takes gabapentin at night, makes her too drowsy during the day.  She was prescribed a steroid dose pack per her PCP and will finish it today, reports mild pain relief.         HPI:   Darwin Arteaga is a 85 y.o. female who complains of back pain    Onset: 3 weeks  Inciting Event: slipped on gravel, slow and controlled fall backwards  Progression: since onset, pain is stable  Typical Range: 3-8/10  Timing: constant  Quality: dull, aching, deep  Radiation: no  Associated numbness or weakness: no numbness, no weakness  Exacerbated by: sitting, bending, lifting, extension  Allievated by: rest, sitting  Is Pain Level Acceptable?: No    Previous Therapies:  PT/OT:   HEP:   Interventions:   Surgery:  Medications:   - NSAIDS:   - MSK Relaxants:   - TCAs:   - SNRIs:   - Topicals:   - Anticonvulsants:  - Opioids: tramadol    History:    Current Outpatient Medications:     AMINO ACIDS/MV,FE,MIN (OCUVITE EXTRA ORAL), Take by mouth., Disp: , Rfl:     aspirin (ECOTRIN) 81 MG EC tablet, Take 81 mg by mouth once daily., Disp: , Rfl:     calcium carbonate (OS-AYLEEN) 600 mg (1,500 mg) Tab, Take 600 mg by mouth 2 (two) times daily with meals., Disp: , Rfl:     cetirizine (ZYRTEC) 10 MG  tablet, Take 10 mg by mouth once daily., Disp: , Rfl:     docusate sodium (COLACE) 100 MG capsule, Take 100 mg by mouth 2 (two) times daily., Disp: , Rfl:     folic acid (FOLVITE) 1 MG tablet, Take 1 mg by mouth once daily., Disp: , Rfl:     magnesium oxide 500 mg Tab, Take by mouth., Disp: , Rfl:     multivitamin capsule, Take 1 capsule by mouth once daily., Disp: , Rfl:     traMADol (ULTRAM) 50 mg tablet, Take 50 mg by mouth every 6 (six) hours as needed for Pain., Disp: , Rfl:     triamterene-hydrochlorothiazide 37.5-25 mg (DYAZIDE) 37.5-25 mg per capsule, Take 1 capsule by mouth every morning., Disp: , Rfl:     gabapentin (NEURONTIN) 100 MG capsule, Take 1 capsule (100 mg total) by mouth 2 (two) times daily., Disp: 60 capsule, Rfl: 2    Past Medical History:   Diagnosis Date    Anemia     Arthritis     Osteoporosis        Past Surgical History:   Procedure Laterality Date    HYSTERECTOMY      KNEE SURGERY      TONSILLECTOMY         Family History   Problem Relation Age of Onset    No Known Problems Mother     Heart attack Father        Social History     Socioeconomic History    Marital status:      Spouse name: Not on file    Number of children: Not on file    Years of education: Not on file    Highest education level: Not on file   Occupational History    Not on file   Social Needs    Financial resource strain: Not on file    Food insecurity     Worry: Not on file     Inability: Not on file    Transportation needs     Medical: Not on file     Non-medical: Not on file   Tobacco Use    Smoking status: Never Smoker   Substance and Sexual Activity    Alcohol use: No    Drug use: Not on file    Sexual activity: Not on file   Lifestyle    Physical activity     Days per week: Not on file     Minutes per session: Not on file    Stress: Not on file   Relationships    Social connections     Talks on phone: Not on file     Gets together: Not on file     Attends Zoroastrian service: Not  "on file     Active member of club or organization: Not on file     Attends meetings of clubs or organizations: Not on file     Relationship status: Not on file   Other Topics Concern    Not on file   Social History Narrative    Not on file       Review of patient's allergies indicates:  No Known Allergies    Review of Systems:  General ROS: negative for - fever  Psychological ROS: negative for - hostility  Hematological and Lymphatic ROS: negative for - bleeding problems  Endocrine ROS: negative for - unexpected weight changes  Respiratory ROS: no cough, shortness of breath, or wheezing  Cardiovascular ROS: no chest pain or dyspnea on exertion  Gastrointestinal ROS: no abdominal pain, change in bowel habits, or black or bloody stools  Musculoskeletal ROS: negative for - muscular weakness  Neurological ROS: negative for - numbness/tingling  Dermatological ROS: negative for rash    Physical Exam:  Vitals:    11/03/20 1402   BP: 138/69   Pulse: 85   Temp: 98.2 °F (36.8 °C)   TempSrc: Temporal   SpO2: 96%   Weight: 81.8 kg (180 lb 3.6 oz)   Height: 5' 8" (1.727 m)   PainSc: 10-Worst pain ever   PainLoc: Hip     Body mass index is 27.4 kg/m².     Gen: NAD  Gait: gait intact  Psych:  Mood appropriate for given condition  HEENT: eyes anicteric   GI: Abd soft  CV: RRR  Lungs: breathing unlabored   ROM: limited AROM of the L spine in all planes, full ROM at ankles, knees and hips  Lumbar flexion 90 degrees able to touch floor with finger tips, extension 50 degrees, side bending 30 degrees.    Sensation: intact to light touch in all dermatomes tested from L2-S1 bilaterally  Reflexes: 2+ b/l patella and 1/1 b/l achilles  Palpation: -TTP over midline lumbar spine,  the b/l greater trochanters or bilateral SI joint  Tone: normal in the b/l knees and hips   Skin: intact  Extremities: No edema in b/l ankles or hands  Provacative tests: + b/l axial facet loading, - b/l SLR       Right Left   L2/3 Iliacus Hip flexion  5  5   L3/4 " Qudratus Femoris Knee Extension  5  5   L4/5 Tib Anterior Ankle Dorsiflexion   5  5   L5/S1 Extensor Hallicus Longus Great toe extension  5  5   L4/5 Tib Anterior/Posterior Inversion  5  5   L5/S1 Extensor Digitorum Longus, Peronues Eversion  5  5   S1/S2 Gastroc/Soleus Plantar Flexion  5  5       Imaging:  MRI lumbar spine 3/2018 independently reviewed  She has bilateral facet arthropathy throughout the lumbar spine worse at L3/4, L4/5, and L5/S1    XR LUMBAR SPINE AP AND LAT WITH FLEX/EXT 3/5/20  CLINICAL HISTORY:  Spondylosis without myelopathy or radiculopathy, lumbar region  TECHNIQUE:  AP and lateral views as well as lateral flexion and extension images are performed through the lumbar spine.  COMPARISON:  None  FINDINGS:  The bones are osteopenic.  There is a moderate to marked anterior wedge compression deformity of L1 which is age indeterminate.  There is trace retropulsion into the ventral spinal canal.  There is also trace retrolisthesis of L2 on L3, L3 on L4.  There is facet joint arthropathy at the lower 2 lumbar levels and mild disc space narrowing through the lumbar spine.  There is bridging osteophyte formation at the thoracolumbar junction.  There is moderate atherosclerosis.  There is no detectable change in alignment on flexion or extension.  Impression:  1. Osteopenia with degenerative disc and facet disease as well as a moderate to marked age-indeterminate anterior wedge compression deformity of L1.  This report was flagged in Epic as abnormal.    MRI lumbar spine 2/24/20 - legacy documents    Labs:  BMP  Lab Results   Component Value Date     02/22/2017    K 4.1 02/22/2017     02/22/2017    CO2 29 02/22/2017    BUN 21 (H) 02/22/2017    CREATININE 0.76 02/22/2017    CALCIUM 9.4 02/22/2017    ANIONGAP 10 02/22/2017    ESTGFRAFRICA >60 02/22/2017    EGFRNONAA >60 02/22/2017     Lab Results   Component Value Date    ALT 33 02/22/2017    AST 33 02/22/2017    ALKPHOS 73 02/22/2017     BILITOT 0.8 02/22/2017     Lab Results   Component Value Date    WBC 6.81 02/22/2017    HGB 12.2 02/22/2017    HCT 37.4 02/22/2017    MCV 78 (L) 02/22/2017     02/22/2017       Assessment:   Problem List Items Addressed This Visit        Neuro    Lumbar spondylosis - Primary    Relevant Orders    Case Request Operating Room: Block-nerve-medial branch-lumbar L3/4, L4/5, L5/s1 (Completed)      Other Visit Diagnoses     Screening examination for infectious disease        Relevant Orders    COVID-19 Routine Screening          85 y.o. year old female presents to the office with back pain.  She's had back pain for many years and it has been previously controlled by pain management in [place].  She reports a history of previous EVAN which worked well for her.        11/3/20 -  Mrs Arteaga returns to clinic today for follow up, her sister is in attendance.  She complains of axial lower back and hipk, constant, dull, aching, 3-8/10 can increase to 10/10.  Her pain is worse when first getting out of bed in the morning, with going from sitting to standing and back extension and relieved with rest.  She denies any radiation into her lower extremities, no numbness or weakness.  She has been to formal PT and continues HEP.  She takes gabapentin at night, makes her too drowsy during the day.  She was prescribed a steroid dose pack per her PCP and will finish it today, reports mild pain relief.   On exam she has 5/5 strength b/l LE, sensation intact to light touch L2-S1 b/l, 2+ b/l patella and 1+ b/l achilles DTRs, + b/l axial facet loading.  We reviewed her lumbar spine MRI report from 3/2018 together today, c/w bilateral facet arthropathy throughout the lumbar spine worse at L3/4, L4/5, and L5/S1.  Her pain is limiting her mobility and interfering with her ADL's.  We will schedule for bilateral diagnostic L3/4, L4/5, and L5/S1 medial branch blocks followed by subsequent RFA if appropriate.  Follow up 1 month post injection.  If  she doesn't obtain relief, we will order an update lumbar MRI to assess her neuroanatomy for consideration of EVAN.        Treatment Plan:   Procedures: bilateral diagnostic L3/4, L4/5, and L5/S1 medial branch blocks.  Procedure explained using an anatomical model.  Risks, benefits, alternatives explained to patient who verbalized understanding, including increased risk of infection, bleeding, need for additional procedures or surgery, and nerve damage.  Questions regarding the procedure, risks, expected outcome, and possible side effects were solicited and answered to the patient's satisfaction.  Darwin wishes to proceed with the injection.  Verbal and written consent were obtained in clinic today.  PT/OT/HEP:  continue HEP, prefers to avoid inpatient PT during COVID  Medications: continue medications as prescribed per PCP  Labs: Reviewed and medications are appropriately dosed for current hepatorenal function.  Imaging: none  : Reviewed and consistent with medication use as prescribed.

## 2020-11-07 ENCOUNTER — LAB VISIT (OUTPATIENT)
Dept: FAMILY MEDICINE | Facility: CLINIC | Age: 85
End: 2020-11-07
Payer: MEDICARE

## 2020-11-07 DIAGNOSIS — Z11.9 SCREENING EXAMINATION FOR INFECTIOUS DISEASE: ICD-10-CM

## 2020-11-07 PROCEDURE — U0003 INFECTIOUS AGENT DETECTION BY NUCLEIC ACID (DNA OR RNA); SEVERE ACUTE RESPIRATORY SYNDROME CORONAVIRUS 2 (SARS-COV-2) (CORONAVIRUS DISEASE [COVID-19]), AMPLIFIED PROBE TECHNIQUE, MAKING USE OF HIGH THROUGHPUT TECHNOLOGIES AS DESCRIBED BY CMS-2020-01-R: HCPCS

## 2020-11-08 LAB — SARS-COV-2 RNA RESP QL NAA+PROBE: NOT DETECTED

## 2020-11-10 ENCOUNTER — HOSPITAL ENCOUNTER (OUTPATIENT)
Dept: RADIOLOGY | Facility: HOSPITAL | Age: 85
Discharge: HOME | End: 2020-11-10
Attending: ANESTHESIOLOGY | Admitting: ANESTHESIOLOGY
Payer: MEDICARE

## 2020-11-10 ENCOUNTER — HOSPITAL ENCOUNTER (OUTPATIENT)
Facility: HOSPITAL | Age: 85
Discharge: HOME | End: 2020-11-10
Attending: ANESTHESIOLOGY | Admitting: ANESTHESIOLOGY
Payer: MEDICARE

## 2020-11-10 VITALS
DIASTOLIC BLOOD PRESSURE: 62 MMHG | HEART RATE: 83 BPM | BODY MASS INDEX: 27.28 KG/M2 | RESPIRATION RATE: 18 BRPM | OXYGEN SATURATION: 98 % | TEMPERATURE: 98 F | HEIGHT: 68 IN | SYSTOLIC BLOOD PRESSURE: 130 MMHG | WEIGHT: 180 LBS

## 2020-11-10 DIAGNOSIS — M47.816 LUMBAR SPONDYLOSIS: ICD-10-CM

## 2020-11-10 DIAGNOSIS — M47.816 LUMBAR SPONDYLOSIS: Primary | ICD-10-CM

## 2020-11-10 PROCEDURE — 64493 PR INJ DX/THER AGNT PARAVERT FACET JOINT,IMG GUIDE,LUMBAR/SAC,1ST LVL: ICD-10-PCS | Mod: 50,,, | Performed by: ANESTHESIOLOGY

## 2020-11-10 PROCEDURE — 64493 INJ PARAVERT F JNT L/S 1 LEV: CPT | Mod: 50,,, | Performed by: ANESTHESIOLOGY

## 2020-11-10 PROCEDURE — 64494 INJ PARAVERT F JNT L/S 2 LEV: CPT | Mod: 50,,, | Performed by: ANESTHESIOLOGY

## 2020-11-10 PROCEDURE — 64495 INJ PARAVERT F JNT L/S 3 LEV: CPT | Mod: 50,,, | Performed by: ANESTHESIOLOGY

## 2020-11-10 PROCEDURE — 25000003 PHARM REV CODE 250: Mod: PO | Performed by: ANESTHESIOLOGY

## 2020-11-10 PROCEDURE — 64495 PR INJ DX/THER AGNT PARAVERT FACET JOINT,IMG GUIDE,LUMBAR/SAC, ADD LEVEL: ICD-10-PCS | Mod: 50,,, | Performed by: ANESTHESIOLOGY

## 2020-11-10 PROCEDURE — 64494 PR INJ DX/THER AGNT PARAVERT FACET JOINT,IMG GUIDE,LUMBAR/SAC, 2ND LEVEL: ICD-10-PCS | Mod: 50,,, | Performed by: ANESTHESIOLOGY

## 2020-11-10 PROCEDURE — 64495 INJ PARAVERT F JNT L/S 3 LEV: CPT | Mod: 50,PO | Performed by: ANESTHESIOLOGY

## 2020-11-10 PROCEDURE — 64493 INJ PARAVERT F JNT L/S 1 LEV: CPT | Mod: 50,PO | Performed by: ANESTHESIOLOGY

## 2020-11-10 PROCEDURE — 64494 INJ PARAVERT F JNT L/S 2 LEV: CPT | Mod: 50,PO | Performed by: ANESTHESIOLOGY

## 2020-11-10 PROCEDURE — 76000 FLUOROSCOPY <1 HR PHYS/QHP: CPT | Mod: TC,PO

## 2020-11-10 RX ORDER — LIDOCAINE HYDROCHLORIDE 10 MG/ML
INJECTION, SOLUTION EPIDURAL; INFILTRATION; INTRACAUDAL; PERINEURAL
Status: DISCONTINUED | OUTPATIENT
Start: 2020-11-10 | End: 2020-11-10 | Stop reason: HOSPADM

## 2020-11-10 RX ORDER — SODIUM CHLORIDE, SODIUM LACTATE, POTASSIUM CHLORIDE, CALCIUM CHLORIDE 600; 310; 30; 20 MG/100ML; MG/100ML; MG/100ML; MG/100ML
INJECTION, SOLUTION INTRAVENOUS CONTINUOUS
Status: DISCONTINUED | OUTPATIENT
Start: 2020-11-10 | End: 2020-11-10 | Stop reason: HOSPADM

## 2020-11-10 RX ORDER — BUPIVACAINE HYDROCHLORIDE 2.5 MG/ML
INJECTION, SOLUTION EPIDURAL; INFILTRATION; INTRACAUDAL
Status: DISCONTINUED | OUTPATIENT
Start: 2020-11-10 | End: 2020-11-10 | Stop reason: HOSPADM

## 2020-11-10 RX ORDER — SODIUM BICARBONATE 42 MG/ML
INJECTION, SOLUTION INTRAVENOUS
Status: DISCONTINUED | OUTPATIENT
Start: 2020-11-10 | End: 2020-11-10 | Stop reason: HOSPADM

## 2020-11-10 NOTE — OP NOTE
Procedure Note    Procedure Date: 11/10/2020    Procedure Performed:  bilateral Lumbar Facet Block(s) (Medial Branch) @ L3/4, L4/5, L5/S1, with Fluoroscopic Guidance    Indications: Patient has failed conservative therapy.      Pre-op diagnosis: Lumbar Spondylosis    Post-op diagnosis: same    Physician: Shilo Miller MD    IV Sedation medications: none    Medications injected: 8 ml Bupivacaine 0.25%    Local anesthetic used: 1% Lidocaine, 4 ml, 8.4% sodium bicarbonate 0.25ml    Estimated Blood Loss: none    Complications:  none    Technique:  The patient was interviewed in the holding area and Risks/Benefits were discussed, including, but not limited to, the possibility of new or different pain, bleeding or infection.   All questions were answered.  The patient understood and accepted risks.  Consent was reviewed.  A time out was taken to identify the patient, procedure and side of the procedure. The patient was placed in a prone position, then prepped and draped in the usual sterile fashion using ChloraPrep and sterile towels.  The levels were determined under fluoroscopic guidance and then marked.  1% Lidocaine was given by raising a wheal at the skin over each site and then infiltrated approximately 2cm deeper.  A 25-gauge 3.5 inch needle was introduced to the anatomic location of the L2-L5 medial branch nerves on the bilateral side.  Then, after negative aspiration, 1.5 cc of 8 ml Bupivacaine 0.25% was injected @ each level.  The patient tolerated the procedure well.  The patient tolerated the procedure well and was transferred to the [facility] in stable condition.  The patient was monitored after the procedure.  Patient was given post procedure and discharge instructions to follow at home.  The patient was discharged in a stable condition.

## 2020-11-10 NOTE — PLAN OF CARE
VSS, all questions answered. Denies recent fever or illness. Pt states ready for procedure.  Dr. Miller at bedside, yanira for iv d/c'd.

## 2020-11-10 NOTE — DISCHARGE INSTRUCTIONS
Home Care Instructions    Apply ice pack to injection site for 20 minute periods for the first 24 hours for soreness/discomfort at injection site  DO NOT USE HEAT FOR 24 HOURS  Keep site clean and dry for 24 hours. If Band-Aid present remove when desired. May shower tomorrow. Pat area dry.  Do not drive until tomorrow  Take care when walking after a lumbar injection        BLOCKS  Resume regular activities today  Pain office will call in next 2 days  Resume home medications as prescribes today  Resume Aspirin, Plavix or Coumadin the day after the procedure unless otherwise intructed    SEE IMMEDIATE MEDICAL HELP FOR:  Severe increase in your usual pain or appearance of new pain  Prolonged or increasing weakness or numbness in the legs or arms  Drainage, redness, active bleeding, or increased swelling at the injection site  Temperature over 100.0 degrees F.  Headache that increases when your head is upright and decrease when you lie flat    CALL 911 OR GO DIRECTLY TO EMERGENCY DEPARTMENT FOR:  Shortness of breath, chest pain, or problems breathing

## 2020-11-10 NOTE — INTERVAL H&P NOTE
The patient has been examined and the H&P has been reviewed:    I concur with the findings and no changes have occurred since H&P was written.   Risks, benefits, alternatives explained to patient who verbalized understanding, including increased risk of infection, bleeding, need for additional procedures or surgery, and nerve damage.  Questions regarding the procedure, risks, expected outcome, and possible side effects were solicited and answered to the patient's satisfaction.  Gwendora wishes to proceed with the injection.  Verbal and written consent were obtained.    Active Hospital Problems    Diagnosis  POA    Lumbar spondylosis [M47.816]  Yes      Resolved Hospital Problems   No resolved problems to display.

## 2020-11-10 NOTE — DISCHARGE SUMMARY
[facility]  Discharge Note  Short Stay    Admit Date: 11/10/2020    Discharge Date: 11/10/2020    Attending Physician: Shilo Miller     Discharge Provider: Shilo Miller    Diagnoses:  Active Hospital Problems    Diagnosis  POA    Lumbar spondylosis [M47.816]  Yes      Resolved Hospital Problems   No resolved problems to display.       Discharged Condition: Good    Final Diagnoses: Lumbar spondylosis [M47.816]    Disposition: Home or Self Care    Hospital Course: No complications, uneventful    Outcome of Hospitalization, Treatment, Procedure, or Surgery:  Patient was admitted for outpatient interventional pain management procedure. The patient tolerated the procedure well with no complications.    Follow up/Patient Instructions:  Follow up as scheduled in Pain Management office in 3-4 weeks.  Patient has received instructions and follow up date and time.    Medications:  Continue previous medications    Discharge Procedure Orders   Notify your health care provider if you experience any of the following:  temperature >100.4     Notify your health care provider if you experience any of the following:  persistent nausea and vomiting or diarrhea     Notify your health care provider if you experience any of the following:  severe uncontrolled pain     Notify your health care provider if you experience any of the following:  redness, tenderness, or signs of infection (pain, swelling, redness, odor or green/yellow discharge around incision site)     Notify your health care provider if you experience any of the following:  difficulty breathing or increased cough     Notify your health care provider if you experience any of the following:  severe persistent headache     Notify your health care provider if you experience any of the following:  worsening rash     Notify your health care provider if you experience any of the following:  persistent dizziness, light-headedness, or visual disturbances     Notify your  health care provider if you experience any of the following:  increased confusion or weakness     Activity as tolerated         Discharge Procedure Orders (must include Diet, Follow-up, Activity):   Discharge Procedure Orders (must include Diet, Follow-up, Activity)   Notify your health care provider if you experience any of the following:  temperature >100.4     Notify your health care provider if you experience any of the following:  persistent nausea and vomiting or diarrhea     Notify your health care provider if you experience any of the following:  severe uncontrolled pain     Notify your health care provider if you experience any of the following:  redness, tenderness, or signs of infection (pain, swelling, redness, odor or green/yellow discharge around incision site)     Notify your health care provider if you experience any of the following:  difficulty breathing or increased cough     Notify your health care provider if you experience any of the following:  severe persistent headache     Notify your health care provider if you experience any of the following:  worsening rash     Notify your health care provider if you experience any of the following:  persistent dizziness, light-headedness, or visual disturbances     Notify your health care provider if you experience any of the following:  increased confusion or weakness     Activity as tolerated

## 2020-11-11 ENCOUNTER — TELEPHONE (OUTPATIENT)
Dept: PAIN MEDICINE | Facility: CLINIC | Age: 85
End: 2020-11-11

## 2020-11-11 NOTE — TELEPHONE ENCOUNTER
Spoke with patient regarding bilateral MBB L3/4, L4/5, L5/S1 performed on 11/10 with Dr. Miller. Patient states she had 0% pain relief from the procedure and her pain remained at a 8-9/10 on the pain scale. She stated it was still hard for her to get up as well. Scheduled a follow up appointment with Bonnie Mullen on 11/13 at 10am.

## 2020-11-13 ENCOUNTER — OFFICE VISIT (OUTPATIENT)
Dept: PAIN MEDICINE | Facility: CLINIC | Age: 85
End: 2020-11-13
Payer: MEDICARE

## 2020-11-13 VITALS
HEIGHT: 66 IN | WEIGHT: 172.75 LBS | TEMPERATURE: 98 F | DIASTOLIC BLOOD PRESSURE: 68 MMHG | HEART RATE: 102 BPM | OXYGEN SATURATION: 99 % | BODY MASS INDEX: 27.76 KG/M2 | SYSTOLIC BLOOD PRESSURE: 126 MMHG

## 2020-11-13 DIAGNOSIS — M48.061 SPINAL STENOSIS OF LUMBAR REGION WITHOUT NEUROGENIC CLAUDICATION: ICD-10-CM

## 2020-11-13 DIAGNOSIS — G89.29 CHRONIC BILATERAL LOW BACK PAIN WITHOUT SCIATICA: Primary | ICD-10-CM

## 2020-11-13 DIAGNOSIS — M54.50 CHRONIC BILATERAL LOW BACK PAIN WITHOUT SCIATICA: Primary | ICD-10-CM

## 2020-11-13 DIAGNOSIS — M47.816 LUMBAR SPONDYLOSIS: ICD-10-CM

## 2020-11-13 PROCEDURE — 3288F FALL RISK ASSESSMENT DOCD: CPT | Mod: CPTII,S$GLB,, | Performed by: NURSE PRACTITIONER

## 2020-11-13 PROCEDURE — 3288F PR FALLS RISK ASSESSMENT DOCUMENTED: ICD-10-PCS | Mod: CPTII,S$GLB,, | Performed by: NURSE PRACTITIONER

## 2020-11-13 PROCEDURE — 1101F PR PT FALLS ASSESS DOC 0-1 FALLS W/OUT INJ PAST YR: ICD-10-PCS | Mod: CPTII,S$GLB,, | Performed by: NURSE PRACTITIONER

## 2020-11-13 PROCEDURE — 1101F PT FALLS ASSESS-DOCD LE1/YR: CPT | Mod: CPTII,S$GLB,, | Performed by: NURSE PRACTITIONER

## 2020-11-13 PROCEDURE — 99999 PR PBB SHADOW E&M-EST. PATIENT-LVL III: CPT | Mod: PBBFAC,,, | Performed by: NURSE PRACTITIONER

## 2020-11-13 PROCEDURE — 99213 OFFICE O/P EST LOW 20 MIN: CPT | Mod: S$GLB,,, | Performed by: NURSE PRACTITIONER

## 2020-11-13 PROCEDURE — 99213 PR OFFICE/OUTPT VISIT, EST, LEVL III, 20-29 MIN: ICD-10-PCS | Mod: S$GLB,,, | Performed by: NURSE PRACTITIONER

## 2020-11-13 PROCEDURE — 1125F PR PAIN SEVERITY QUANTIFIED, PAIN PRESENT: ICD-10-PCS | Mod: S$GLB,,, | Performed by: NURSE PRACTITIONER

## 2020-11-13 PROCEDURE — 99999 PR PBB SHADOW E&M-EST. PATIENT-LVL III: ICD-10-PCS | Mod: PBBFAC,,, | Performed by: NURSE PRACTITIONER

## 2020-11-13 PROCEDURE — 1125F AMNT PAIN NOTED PAIN PRSNT: CPT | Mod: S$GLB,,, | Performed by: NURSE PRACTITIONER

## 2020-11-13 NOTE — PROGRESS NOTES
[facility] Follow-up Evaluation    Referred by: Dr. Harrison  Reason for referral: back pain    CC:   Chief Complaint   Patient presents with    Low-back Pain      Last 3 PDI Scores 11/13/2020 11/3/2020 5/19/2020   Pain Disability Index (PDI) 22 24 2       Interval HPI 11/13/20:  Mrs Arteaga returns to clinic today for follow up, her sister is in attendance and participating in the history portion of the exam.  She is s/p bilateral L3/4, L4/5, L5/S1 diagnostic medial branch blocks on 11/10/20 with no relief of her low back pain.  She continues to have pain 9/10, worse when first getting out of bed in the morning, with going from sitting to standing and back extension and relieved with rest.  She denies any radiation into her lower extremities, no numbness or weakness.  She has been to formal PT and continues HEP.  She takes gabapentin at night, makes her too drowsy during the day.     HPI:   Darwin Arteaga is a 85 y.o. female who complains of back pain    Onset: 3 weeks  Inciting Event: slipped on gravel, slow and controlled fall backwards  Progression: since onset, pain is stable  Typical Range: 3-8/10  Timing: constant  Quality: dull, aching, deep  Radiation: no  Associated numbness or weakness: no numbness, no weakness  Exacerbated by: sitting, bending, lifting, extension  Allievated by: rest, sitting  Is Pain Level Acceptable?: No    Previous Therapies:  PT/OT:   HEP:   Interventions:   Surgery:  Medications:   - NSAIDS:   - MSK Relaxants:   - TCAs:   - SNRIs:   - Topicals:   - Anticonvulsants:  - Opioids: tramadol    History:    Current Outpatient Medications:     AMINO ACIDS/MV,FE,MIN (OCUVITE EXTRA ORAL), Take by mouth., Disp: , Rfl:     aspirin (ECOTRIN) 81 MG EC tablet, Take 81 mg by mouth once daily., Disp: , Rfl:     calcium carbonate (OS-AYLEEN) 600 mg (1,500 mg) Tab, Take 600 mg by mouth 2 (two) times daily with meals., Disp: , Rfl:     cetirizine (ZYRTEC) 10 MG tablet, Take 10 mg by mouth once  daily., Disp: , Rfl:     docusate sodium (COLACE) 100 MG capsule, Take 100 mg by mouth 2 (two) times daily., Disp: , Rfl:     folic acid (FOLVITE) 1 MG tablet, Take 1 mg by mouth once daily., Disp: , Rfl:     magnesium oxide 500 mg Tab, Take by mouth., Disp: , Rfl:     multivitamin capsule, Take 1 capsule by mouth once daily., Disp: , Rfl:     traMADol (ULTRAM) 50 mg tablet, Take 50 mg by mouth every 6 (six) hours as needed for Pain., Disp: , Rfl:     triamterene-hydrochlorothiazide 37.5-25 mg (DYAZIDE) 37.5-25 mg per capsule, Take 1 capsule by mouth every morning., Disp: , Rfl:     gabapentin (NEURONTIN) 100 MG capsule, Take 1 capsule (100 mg total) by mouth 2 (two) times daily., Disp: 60 capsule, Rfl: 2    Past Medical History:   Diagnosis Date    Anemia     Arthritis     Osteoporosis        Past Surgical History:   Procedure Laterality Date    cataract surgery Bilateral     HYSTERECTOMY      INJECTION OF ANESTHETIC AGENT AROUND MEDIAL BRANCH NERVES INNERVATING LUMBAR FACET JOINT Bilateral 11/10/2020    Procedure: Block-nerve-medial branch-lumbar L3/4, L4/5, L5/s1;  Surgeon: Shilo Miller MD;  Location: [facility] OR;  Service: Pain Management;  Laterality: Bilateral;    KNEE SURGERY      TONSILLECTOMY         Family History   Problem Relation Age of Onset    No Known Problems Mother     Heart attack Father        Social History     Socioeconomic History    Marital status:      Spouse name: Not on file    Number of children: Not on file    Years of education: Not on file    Highest education level: Not on file   Occupational History    Not on file   Social Needs    Financial resource strain: Not on file    Food insecurity     Worry: Not on file     Inability: Not on file    Transportation needs     Medical: Not on file     Non-medical: Not on file   Tobacco Use    Smoking status: Never Smoker    Smokeless tobacco: Never Used   Substance and Sexual Activity    Alcohol use: No    Drug  "use: Never    Sexual activity: Not on file   Lifestyle    Physical activity     Days per week: Not on file     Minutes per session: Not on file    Stress: Not on file   Relationships    Social connections     Talks on phone: Not on file     Gets together: Not on file     Attends [employer] service: Not on file     Active member of club or organization: Not on file     Attends meetings of clubs or organizations: Not on file     Relationship status: Not on file   Other Topics Concern    Not on file   Social History Narrative    Not on file       Review of patient's allergies indicates:  No Known Allergies    Review of Systems:  General ROS: negative for - fever  Psychological ROS: negative for - hostility  Hematological and Lymphatic ROS: negative for - bleeding problems  Endocrine ROS: negative for - unexpected weight changes  Respiratory ROS: no cough, shortness of breath, or wheezing  Cardiovascular ROS: no chest pain or dyspnea on exertion  Gastrointestinal ROS: no abdominal pain, change in bowel habits, or black or bloody stools  Musculoskeletal ROS: negative for - muscular weakness  Neurological ROS: negative for - numbness/tingling  Dermatological ROS: negative for rash    Physical Exam:  Vitals:    11/13/20 1353   BP: 126/68   Pulse: 102   Temp: 97.6 °F (36.4 °C)   TempSrc: Temporal   SpO2: 99%   Weight: 78.4 kg (172 lb 11.7 oz)   Height: 5' 6" (1.676 m)   PainSc:   9   PainLoc: Back     Body mass index is 27.88 kg/m².     Gen: NAD  Gait: gait intact  Psych:  Mood appropriate for given condition  HEENT: eyes anicteric   GI: Abd soft  CV: RRR  Lungs: breathing unlabored   ROM: limited AROM of the L spine in all planes, full ROM at ankles, knees and hips  Lumbar flexion 90 degrees able to touch floor with finger tips, extension 50 degrees, side bending 30 degrees.    Sensation: intact to light touch in all dermatomes tested from L2-S1 bilaterally  Reflexes: 2+ b/l patella and 1/1 b/l achilles  Palpation: " -TTP over midline lumbar spine,  the b/l greater trochanters or bilateral SI joint  Tone: normal in the b/l knees and hips   Skin: intact  Extremities: No edema in b/l ankles or hands  Provacative tests: + b/l axial facet loading, - b/l SLR       Right Left   L2/3 Iliacus Hip flexion  5  5   L3/4 Qudratus Femoris Knee Extension  5  5   L4/5 Tib Anterior Ankle Dorsiflexion   5  5   L5/S1 Extensor Hallicus Longus Great toe extension  5  5   L4/5 Tib Anterior/Posterior Inversion  5  5   L5/S1 Extensor Digitorum Longus, Peronues Eversion  5  5   S1/S2 Gastroc/Soleus Plantar Flexion  5  5       Imaging:  MRI lumbar spine 3/2018 independently reviewed  She has bilateral facet arthropathy throughout the lumbar spine worse at L3/4, L4/5, and L5/S1    XR LUMBAR SPINE AP AND LAT WITH FLEX/EXT 3/5/20  CLINICAL HISTORY:  Spondylosis without myelopathy or radiculopathy, lumbar region  TECHNIQUE:  AP and lateral views as well as lateral flexion and extension images are performed through the lumbar spine.  COMPARISON:  None  FINDINGS:  The bones are osteopenic.  There is a moderate to marked anterior wedge compression deformity of L1 which is age indeterminate.  There is trace retropulsion into the ventral spinal canal.  There is also trace retrolisthesis of L2 on L3, L3 on L4.  There is facet joint arthropathy at the lower 2 lumbar levels and mild disc space narrowing through the lumbar spine.  There is bridging osteophyte formation at the thoracolumbar junction.  There is moderate atherosclerosis.  There is no detectable change in alignment on flexion or extension.  Impression:  1. Osteopenia with degenerative disc and facet disease as well as a moderate to marked age-indeterminate anterior wedge compression deformity of L1.  This report was flagged in Epic as abnormal.    MRI lumbar spine 2/24/20 - legacy documents    Labs:  BMP  Lab Results   Component Value Date     02/22/2017    K 4.1 02/22/2017     02/22/2017     CO2 29 02/22/2017    BUN 21 (H) 02/22/2017    CREATININE 0.76 02/22/2017    CALCIUM 9.4 02/22/2017    ANIONGAP 10 02/22/2017    ESTGFRAFRICA >60 02/22/2017    EGFRNONAA >60 02/22/2017     Lab Results   Component Value Date    ALT 33 02/22/2017    AST 33 02/22/2017    ALKPHOS 73 02/22/2017    BILITOT 0.8 02/22/2017     Lab Results   Component Value Date    WBC 6.81 02/22/2017    HGB 12.2 02/22/2017    HCT 37.4 02/22/2017    MCV 78 (L) 02/22/2017     02/22/2017       Assessment:   Problem List Items Addressed This Visit        Neuro    Lumbar spondylosis       Orthopedic    Chronic bilateral low back pain without sciatica - Primary      Other Visit Diagnoses     Spinal stenosis of lumbar region without neurogenic claudication              85 y.o. year old female presents to the office with back pain.  She's had back pain for many years and it has been previously controlled by pain management in [place].  She reports a history of previous EVAN which worked well for her.        11/13/20 - Mrs Arteaga returns to clinic today for follow up.  She is s/p bilateral L3/4, L4/5, L5/S1 diagnostic medial branch blocks on 11/10/20 with no relief of her low back pain.  She continues to have pain 9/10, worse when first getting out of bed in the morning, with going from sitting to standing and back extension and relieved with rest.  She denies any radiation into her lower extremities, no numbness or weakness.  She has been to formal PT and continues HEP.  She takes gabapentin at night, makes her too drowsy during the day.  On exam she has 5/5 strength b/l LE, sensation intact to light touch L2-S1 b/l, 2+ b/l patella and 1+ b/l achilles DTRs, + b/l axial facet loading.  We reviewed her lumbar spine MRI report from 2/24/20 together today, c/w multilevel mild central canal stenosis.  Her pain is limiting her mobility and interfering with her ADL's.  I have requested she provide a copy of her MRI images to review in consideration of  EVAN.  We will call her after review of MRI.      Treatment Plan:   Procedures: none   PT/OT/HEP:  continue HEP, prefers to avoid inpatient PT during COVID  Medications: continue medications as prescribed per PCP  Labs: Reviewed and medications are appropriately dosed for current hepatorenal function.  Imaging: none  : Reviewed and consistent with medication use as prescribed.    Attending Physician:  Shilo Miller M.D.  Interventional Pain Medicine / Anesthesiology

## 2020-11-17 RX ORDER — GABAPENTIN 100 MG/1
100 CAPSULE ORAL 2 TIMES DAILY
Qty: 60 CAPSULE | Refills: 2 | Status: ON HOLD | OUTPATIENT
Start: 2020-11-17 | End: 2021-04-29 | Stop reason: HOSPADM

## 2020-11-23 ENCOUNTER — NURSE TRIAGE (OUTPATIENT)
Dept: ADMINISTRATIVE | Facility: CLINIC | Age: 85
End: 2020-11-23

## 2020-11-23 NOTE — TELEPHONE ENCOUNTER
No answer and no vm to leave message for post op symptoms call.    Reason for Disposition   Unable to complete triage due to phone connection issues    Protocols used: NO CONTACT OR DUPLICATE CONTACT CALL-A-AH

## 2021-02-08 ENCOUNTER — LAB VISIT (OUTPATIENT)
Dept: INFUSION THERAPY | Facility: HOSPITAL | Age: 86
End: 2021-02-08
Attending: RADIOLOGY
Payer: MEDICARE

## 2021-02-08 DIAGNOSIS — Z03.818 ENCNTR FOR OBS FOR SUSP EXPSR TO OTH BIOLG AGENTS RULED OUT: Primary | ICD-10-CM

## 2021-02-08 LAB — SARS-COV-2 RDRP RESP QL NAA+PROBE: NEGATIVE

## 2021-02-08 PROCEDURE — U0002 COVID-19 LAB TEST NON-CDC: HCPCS | Mod: PN

## 2021-02-08 PROCEDURE — U0002 COVID-19 LAB TEST NON-CDC: HCPCS

## 2021-04-27 PROBLEM — Z71.89 ACP (ADVANCE CARE PLANNING): Status: ACTIVE | Noted: 2021-04-27

## 2021-04-27 PROBLEM — G93.41 ACUTE METABOLIC ENCEPHALOPATHY: Status: ACTIVE | Noted: 2021-04-27

## 2021-04-27 PROBLEM — E86.1 INTRAVASCULAR VOLUME DEPLETION: Status: ACTIVE | Noted: 2021-04-27

## 2021-04-27 PROBLEM — R53.83 FATIGUE: Status: ACTIVE | Noted: 2021-04-27

## 2021-04-27 PROBLEM — E87.1 HYPONATREMIA: Status: ACTIVE | Noted: 2021-04-27

## 2021-05-12 ENCOUNTER — PATIENT MESSAGE (OUTPATIENT)
Dept: RESEARCH | Facility: HOSPITAL | Age: 86
End: 2021-05-12

## 2021-10-04 RX ORDER — GABAPENTIN 100 MG/1
100 CAPSULE ORAL 2 TIMES DAILY
COMMUNITY
Start: 2021-09-30 | End: 2021-11-30 | Stop reason: SDUPTHER

## 2021-10-04 RX ORDER — GABAPENTIN 100 MG/1
100 CAPSULE ORAL 2 TIMES DAILY
Status: CANCELLED | OUTPATIENT
Start: 2021-10-04

## 2021-11-30 RX ORDER — GABAPENTIN 100 MG/1
100 CAPSULE ORAL 2 TIMES DAILY
Qty: 60 CAPSULE | Refills: 0 | Status: SHIPPED | OUTPATIENT
Start: 2021-11-30 | End: 2021-12-30
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
## 2022-05-10 PROBLEM — S72.142A CLOSED DISPLACED INTERTROCHANTERIC FRACTURE OF LEFT FEMUR: Status: ACTIVE | Noted: 2022-05-10

## 2022-05-10 PROBLEM — S72.25XA CLOSED NONDISPLACED SUBTROCHANTERIC FRACTURE OF LEFT FEMUR: Status: ACTIVE | Noted: 2022-05-10
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
## 2022-05-10 PROBLEM — S72.22XA CLOSED DISPLACED SUBTROCHANTERIC FRACTURE OF LEFT FEMUR: Status: ACTIVE | Noted: 2022-05-10

## 2022-05-10 PROBLEM — D64.9 NORMOCYTIC ANEMIA: Status: ACTIVE | Noted: 2022-05-10

## 2022-05-13 PROBLEM — K59.00 CONSTIPATION: Status: ACTIVE | Noted: 2022-05-13

## 2023-11-09 ENCOUNTER — TELEPHONE (OUTPATIENT)
Dept: PAIN MEDICINE | Facility: CLINIC | Age: 88
End: 2023-11-09
Payer: MEDICARE

## (undated) DEVICE — APPLICATOR CHLORAPREP CLR 10.5

## (undated) DEVICE — SEE MEDLINE ITEM 152622

## (undated) DEVICE — NDL SPINAL 25GX3.5 SPINOCAN

## (undated) DEVICE — TRAY NERVE BLOCK

## (undated) DEVICE — GLOVE 7.5 PROTEXIS PI MICRO